# Patient Record
Sex: FEMALE | Race: WHITE | NOT HISPANIC OR LATINO
[De-identification: names, ages, dates, MRNs, and addresses within clinical notes are randomized per-mention and may not be internally consistent; named-entity substitution may affect disease eponyms.]

---

## 2018-01-31 ENCOUNTER — RESULT REVIEW (OUTPATIENT)
Age: 48
End: 2018-01-31

## 2018-02-12 ENCOUNTER — OUTPATIENT (OUTPATIENT)
Dept: OUTPATIENT SERVICES | Facility: HOSPITAL | Age: 48
LOS: 1 days | End: 2018-02-12
Payer: COMMERCIAL

## 2018-02-12 ENCOUNTER — APPOINTMENT (OUTPATIENT)
Dept: ULTRASOUND IMAGING | Facility: CLINIC | Age: 48
End: 2018-02-12
Payer: COMMERCIAL

## 2018-02-12 ENCOUNTER — APPOINTMENT (OUTPATIENT)
Dept: MAMMOGRAPHY | Facility: CLINIC | Age: 48
End: 2018-02-12
Payer: COMMERCIAL

## 2018-02-12 DIAGNOSIS — Z00.8 ENCOUNTER FOR OTHER GENERAL EXAMINATION: ICD-10-CM

## 2018-02-12 PROCEDURE — 77063 BREAST TOMOSYNTHESIS BI: CPT | Mod: 26

## 2018-02-12 PROCEDURE — 77067 SCR MAMMO BI INCL CAD: CPT | Mod: 26

## 2018-02-12 PROCEDURE — 76856 US EXAM PELVIC COMPLETE: CPT | Mod: 26

## 2018-02-12 PROCEDURE — 76856 US EXAM PELVIC COMPLETE: CPT

## 2018-02-12 PROCEDURE — 76830 TRANSVAGINAL US NON-OB: CPT | Mod: 26

## 2018-02-12 PROCEDURE — 76830 TRANSVAGINAL US NON-OB: CPT

## 2018-02-12 PROCEDURE — 77063 BREAST TOMOSYNTHESIS BI: CPT

## 2018-02-12 PROCEDURE — 77067 SCR MAMMO BI INCL CAD: CPT

## 2020-04-30 ENCOUNTER — MESSAGE (OUTPATIENT)
Age: 50
End: 2020-04-30

## 2020-05-06 LAB
SARS-COV-2 IGG SERPL IA-ACNC: 0.1 INDEX
SARS-COV-2 IGG SERPL QL IA: NEGATIVE

## 2020-05-21 ENCOUNTER — APPOINTMENT (OUTPATIENT)
Dept: OBGYN | Facility: CLINIC | Age: 50
End: 2020-05-21
Payer: COMMERCIAL

## 2020-05-21 VITALS
SYSTOLIC BLOOD PRESSURE: 138 MMHG | HEIGHT: 65 IN | DIASTOLIC BLOOD PRESSURE: 82 MMHG | BODY MASS INDEX: 48.82 KG/M2 | WEIGHT: 293 LBS

## 2020-05-21 PROCEDURE — 82270 OCCULT BLOOD FECES: CPT

## 2020-05-21 PROCEDURE — 99396 PREV VISIT EST AGE 40-64: CPT | Mod: 25

## 2020-05-21 PROCEDURE — 58301 REMOVE INTRAUTERINE DEVICE: CPT

## 2020-05-21 PROCEDURE — 81003 URINALYSIS AUTO W/O SCOPE: CPT | Mod: QW

## 2020-05-23 LAB
C TRACH RRNA SPEC QL NAA+PROBE: NOT DETECTED
HPV HIGH+LOW RISK DNA PNL CVX: NOT DETECTED
N GONORRHOEA RRNA SPEC QL NAA+PROBE: NOT DETECTED
SOURCE TP AMPLIFICATION: NORMAL

## 2020-05-28 ENCOUNTER — RESULT REVIEW (OUTPATIENT)
Age: 50
End: 2020-05-28

## 2020-05-28 ENCOUNTER — APPOINTMENT (OUTPATIENT)
Dept: MAMMOGRAPHY | Facility: CLINIC | Age: 50
End: 2020-05-28
Payer: COMMERCIAL

## 2020-05-28 PROCEDURE — 77063 BREAST TOMOSYNTHESIS BI: CPT

## 2020-05-28 PROCEDURE — 77067 SCR MAMMO BI INCL CAD: CPT

## 2020-05-29 LAB
ACTINOMYCES CULTURE FOR IUD: NORMAL
CYTOLOGY CVX/VAG DOC THIN PREP: NORMAL

## 2020-05-31 NOTE — PHYSICAL EXAM
[Awake] : awake [Acute Distress] : no acute distress [Alert] : alert [Nipple Discharge] : no nipple discharge [Mass] : no breast mass [Tender] : non tender [Axillary LAD] : no axillary lymphadenopathy [Soft] : soft [Oriented x3] : oriented to person, place, and time [Labia Majora] : labia major [Normal] : clitoris [Labia Minora] : labia minora [No Bleeding] : there was no active vaginal bleeding [No Tenderness] : no rectal tenderness [IUD String] : had an IUD string protruding out [Uterine Adnexae] : were not tender and not enlarged [RRR, No Murmurs] : RRR, no murmurs [CTAB] : CTAB [Occult Blood] : occult blood test from digital rectal exam was negative

## 2020-05-31 NOTE — PROCEDURE
[Cervical Pap Smear] : cervical Pap smear [Liquid Base] : liquid base [GC & Chlamydia via Pap] : GC & Chlamydia via Pap [IUD Removal] : IUD [Mirena] : Mirena [ IUD] :  IUD [Patient] : patient [Risks] : risks [Alternatives] : alternatives [Benefits] : benefits [Speculum Placed] : a speculum was placed in the vagina [Consent Obtained] : consent was obtained prior to the procedure and is detailed in the patient's record [Strings Visualized] : the IUD strings were visualized [IUD Removed - Forceps] : the strings were grasped with forceps and the IUD was removed [Cultured] : cultured [No Complications] : none

## 2020-05-31 NOTE — HISTORY OF PRESENT ILLNESS
[Last Mammogram ___] : Last Mammogram was [unfilled] [Last Pap ___] : Last cervical pap smear was [unfilled] [BRCA1 ___] : BRCA1 gene [unfilled] [BRCA2 ___] : BRCA2 gene [unfilled] [Menarche Age: ____] : age at menarche was [unfilled] [ ___] :  [unfilled] [Hot Flashes] : hot flashes [Night Sweats] : night sweats [unknown] : the patient is unsure of the date of her LMP [Sexually Active] : is sexually active [Monogamous] : is monogamous [Contraception] : uses contraception [IUD] : has an intrauterine device [Male ___] : [unfilled] male [No] : no [de-identified] : Last Pelvic Ultrasound: 10/13/2015 [Vomiting] : no vomiting [Fever] : no fever [Nausea] : no nausea [Vaginal Bleeding] : no vaginal bleeding [Diarrhea] : no diarrhea [Pelvic Pressure] : no pelvic pressure [Burning] : no burning [Dysuria] : no dysuria [Itching] : no itching [Lesion] : no lesion [Stinging] : no stinging [Mass] : no mass [Discharge] : no discharge [Irregular Menses] : normal menses [Soreness] : no soreness [Localized Pain] : no localized pain [Mass (___cm)] : no palpable mass [Diffused Pain] : no diffused pain [Nipple Discharge] : no nipple discharge [Skin Color Change] : no skin color change [Dyspareunia] : no dyspareunia [Mood Changes] : no mood changes [Vaginal Itching] : no vaginal itching

## 2020-06-11 ENCOUNTER — ASOB RESULT (OUTPATIENT)
Age: 50
End: 2020-06-11

## 2020-06-11 ENCOUNTER — APPOINTMENT (OUTPATIENT)
Dept: OBGYN | Facility: CLINIC | Age: 50
End: 2020-06-11
Payer: COMMERCIAL

## 2020-06-11 VITALS
WEIGHT: 293 LBS | HEIGHT: 65 IN | SYSTOLIC BLOOD PRESSURE: 130 MMHG | BODY MASS INDEX: 48.82 KG/M2 | DIASTOLIC BLOOD PRESSURE: 84 MMHG

## 2020-06-11 DIAGNOSIS — B35.6 TINEA CRURIS: ICD-10-CM

## 2020-06-11 PROCEDURE — 76830 TRANSVAGINAL US NON-OB: CPT

## 2020-06-11 PROCEDURE — 81025 URINE PREGNANCY TEST: CPT

## 2020-06-11 PROCEDURE — 58300 INSERT INTRAUTERINE DEVICE: CPT

## 2020-06-11 NOTE — HISTORY OF PRESENT ILLNESS
[___ Month(s) Ago] : [unfilled] month(s) ago [Good] : being in good health [Healthy Diet] : a healthy diet [Definite:  ___ (Date)] : the last menstrual period was [unfilled] [Menarche Age: ____] : age at menarche was [unfilled] [Contraception] : uses contraception [Sexually Active] : is sexually active [Monogamous] : is monogamous [Male ___] : [unfilled] male [NA] : N/A [Last Mammogram ___] : Last Mammogram was [unfilled] [Last Pap ___] : Last cervical pap smear was [unfilled] [Up to Date] : up to date with ~his/her~ STD screening [Last Screen ___] : last STD screen was [unfilled]

## 2020-06-14 LAB
HCG UR QL: NEGATIVE
QUALITY CONTROL: YES

## 2020-06-14 NOTE — PROCEDURE
[Risks] : risks [Benefits] : benefits [Alternatives] : alternatives [Patient] : patient [Infection] : infection [Bleeding] : bleeding [Pain] : pain [Expulsion] : expulsion [Failure] : failure [Uterine Perforation] : uterine perforation [CONSENT OBTAINED] : written consent was obtained prior to the procedure. [Ibuprofen ___ mg] : ibuprofen [unfilled] ~Umg [Betadine] : Prepped with Betadine [None] : none [Tenaculum] : a single toothed tenaculum [Easy Passage] : allowed easy passage of a uterine sound without dilation [Post Placement Transvag. US] : post placement transvaginal ultrasound completed [Mirena IUD] : The Mirena IUD was inserted past the internal cervical os. The IUD was then gently inserted upwards toward the fundus.  The IUD strings were cut to an appropriate length. [Tolerated Well] : the patient tolerated the procedure well [No Complications] : there were no complications [de-identified] : Endometrial suppression

## 2020-06-14 NOTE — PHYSICAL EXAM
[Awake] : awake [Alert] : alert [Soft] : soft [Oriented x3] : oriented to person, place, and time [Labia Majora] : labia major [Labia Minora] : labia minora [Normal] : clitoris [Anteversion] : anteverted [Uterine Adnexae] : were not tender and not enlarged [RRR, No Murmurs] : RRR, no murmurs [CTAB] : CTAB [Acute Distress] : no acute distress [Tender] : non tender [Distended] : not distended [Depressed Mood] : not depressed

## 2020-10-22 ENCOUNTER — ASOB RESULT (OUTPATIENT)
Age: 50
End: 2020-10-22

## 2020-10-22 ENCOUNTER — APPOINTMENT (OUTPATIENT)
Dept: OBGYN | Facility: CLINIC | Age: 50
End: 2020-10-22
Payer: COMMERCIAL

## 2020-10-22 VITALS
WEIGHT: 293 LBS | SYSTOLIC BLOOD PRESSURE: 130 MMHG | DIASTOLIC BLOOD PRESSURE: 90 MMHG | HEIGHT: 65 IN | TEMPERATURE: 97.5 F | BODY MASS INDEX: 48.82 KG/M2

## 2020-10-22 PROCEDURE — 99072 ADDL SUPL MATRL&STAF TM PHE: CPT

## 2020-10-22 PROCEDURE — 99213 OFFICE O/P EST LOW 20 MIN: CPT | Mod: 25

## 2020-10-22 NOTE — DISCUSSION/SUMMARY
[FreeTextEntry1] : Normal pelvic sonogram from today, 10/22/20 reviewed in detail with the patient - IUD in place.\par \par Advised to monitor blood pressure at home - recommend [employer] blood pressure machine.\par \par During this visit 15 minutes were spent face-to-face with greater than 50% of the time dedicated to counseling.

## 2020-10-22 NOTE — END OF VISIT
[FreeTextEntry3] : I, Yoli Zaldivar, solely acted as scribe for Dr. Delio Kelley on 10/22/2020.\par All medical entries made by the Scribe were at my, Dr. Kelley's direction and personally dictated by me on 10/22/2020. I have reviewed the chart and agree that the record accurately reflects my personal performance of the history, physical exam, assessment and plan. I have also personally directed, reviewed, and agreed with the chart.

## 2020-10-22 NOTE — HISTORY OF PRESENT ILLNESS
[Patient reported PAP Smear was normal] : Patient reported PAP Smear was normal [Gonorrhea test offered] : Gonorrhea test offered [Chlamydia test offered] : Chlamydia test offered [HPV test offered] : HPV test offered [N] : Patient reports normal menses [Menarche Age: ____] : age at menarche was [unfilled] [Currently Active] : currently active [Men] : men [Vaginal] : vaginal [No] : No [Yes] : Yes [IUD] : has an intrauterine device [Monogamous (Male Partner)] : is monogamous with a male partner [Y] : Positive pregnancy history [Patient refuses STI testing] : Patient refuses STI testing [PapSmeardate] : 05/21/2020 [TextBox_31] : NEG [GonorrheaDate] : 05/21/2020 [TextBox_63] : NEG [ChlamydiaDate] : 05/21/2020 [TextBox_68] : NEG [HPVDate] : 05/21/2020 [TextBox_78] : NEG [LMPDate] : 09/2020 [de-identified] : Mirena [PGHxTotal] : 4 [[facility] [[facility]] : 3 [TextBox_6] : 09/2020 [TextBox_9] : 12 [FreeTextEntry1] : 09/2020 [FreeTextEntry3] : IUD ( Mirena)

## 2022-03-22 ENCOUNTER — APPOINTMENT (OUTPATIENT)
Dept: OBGYN | Facility: CLINIC | Age: 52
End: 2022-03-22
Payer: COMMERCIAL

## 2022-03-22 VITALS
WEIGHT: 293 LBS | SYSTOLIC BLOOD PRESSURE: 132 MMHG | DIASTOLIC BLOOD PRESSURE: 80 MMHG | HEIGHT: 65 IN | BODY MASS INDEX: 48.82 KG/M2

## 2022-03-22 DIAGNOSIS — N60.19 DIFFUSE CYSTIC MASTOPATHY OF UNSPECIFIED BREAST: ICD-10-CM

## 2022-03-22 LAB
DATE COLLECTED: NORMAL
HEMOCCULT SP1 STL QL: NEGATIVE
QUALITY CONTROL: YES

## 2022-03-22 PROCEDURE — 82270 OCCULT BLOOD FECES: CPT

## 2022-03-22 PROCEDURE — 99396 PREV VISIT EST AGE 40-64: CPT

## 2022-03-25 RX ORDER — CLOTRIMAZOLE AND BETAMETHASONE DIPROPIONATE 10; .5 MG/G; MG/G
1-0.05 CREAM TOPICAL TWICE DAILY
Qty: 1 | Refills: 0 | Status: ACTIVE | COMMUNITY
Start: 2020-06-11 | End: 1900-01-01

## 2022-03-28 ENCOUNTER — TRANSCRIPTION ENCOUNTER (OUTPATIENT)
Age: 52
End: 2022-03-28

## 2022-04-26 ENCOUNTER — APPOINTMENT (OUTPATIENT)
Dept: ULTRASOUND IMAGING | Facility: CLINIC | Age: 52
End: 2022-04-26

## 2022-04-26 ENCOUNTER — RESULT REVIEW (OUTPATIENT)
Age: 52
End: 2022-04-26

## 2022-04-26 ENCOUNTER — APPOINTMENT (OUTPATIENT)
Dept: MAMMOGRAPHY | Facility: CLINIC | Age: 52
End: 2022-04-26
Payer: COMMERCIAL

## 2022-04-26 PROCEDURE — 77067 SCR MAMMO BI INCL CAD: CPT

## 2022-04-26 PROCEDURE — 77063 BREAST TOMOSYNTHESIS BI: CPT

## 2022-04-26 PROCEDURE — 76641 ULTRASOUND BREAST COMPLETE: CPT | Mod: 50

## 2022-04-26 PROCEDURE — 76830 TRANSVAGINAL US NON-OB: CPT

## 2022-05-09 LAB
CYTOLOGY CVX/VAG DOC THIN PREP: NORMAL
HPV HIGH+LOW RISK DNA PNL CVX: NOT DETECTED

## 2022-05-09 NOTE — HISTORY OF PRESENT ILLNESS
[LMP unknown] : LMP unknown [IUD] : has an intrauterine device [Y] : Positive pregnancy history [unknown] : Patient is unsure of the date of her LMP [Menarche Age: ____] : age at menarche was [unfilled] [No] : Patient does not have concerns regarding sex [Currently Active] : currently active [Men] : men [FreeTextEntry1] : Deborah is here for annual exam and she reports that she is doing well with the exception of some weight gain following isolation due to Covid. [Mammogramdate] : 05/28/20 [TextBox_19] : BR1 [Papeardate] : 05/21/20 [TextBox_31] : NEG [HPVDate] : 05/21/20 [TextBox_78] : NEG [de-identified] : MIRENA [PGHxTotal] : 4 [[facility]] : 4 [[facility]] : 3

## 2022-05-09 NOTE — DISCUSSION/SUMMARY
[FreeTextEntry1] : During this visit comprehensive counseling was given regarding the following concerns:\par \par 1 We discussed the need for proper nutrition and exercise.  This includes cardiovascular and pelvic floor exercise.\par \par 2 We discussed the importance of maintaining a proper vaccination schedule and we discussed the utility of the flu vaccine and TDaP and Covid 19 when available.\par \par 3 We discussed the impact of chronic sun exposure and the risk for skin disease as a result. The benefits of a total body scan by a Dermatologist was reviewed..\par \par 4 We discussed the need for certain supplements for most people which include vitamin D3, calcium rich foods with limitation on calcium supplementation by tabular form to 600        mg by mouth daily.  As part of a bone health program we recommend weightbearing exercises, and some limited sun exposure ( 10-15 minutes daily) to help convert vitamin D to its active form.\par She will go for her breast imaging as prescribed.  We will also perform a transvaginal sonogram due to her obesity to evaluate the endometrium the position of her IUD and her ovarian status.

## 2022-05-09 NOTE — PHYSICAL EXAM
[Chaperone Present] : A chaperone was present in the examining room during all aspects of the physical examination [FreeTextEntry1] : Kim Machado [Appropriately responsive] : appropriately responsive [Alert] : alert [No Acute Distress] : no acute distress [No Lymphadenopathy] : no lymphadenopathy [Regular Rate Rhythm] : regular rate rhythm [No Murmurs] : no murmurs [Clear to Auscultation B/L] : clear to auscultation bilaterally [Soft] : soft [Non-tender] : non-tender [Non-distended] : non-distended [No HSM] : No HSM [No Lesions] : no lesions [No Mass] : no mass [Oriented x3] : oriented x3 [Examination Of The Breasts] : a normal appearance [No Masses] : no breast masses were palpable [Labia Majora] : normal [Labia Minora] : normal [IUD String] : an IUD string was noted [Normal] : normal [Uterine Adnexae] : normal [No Tenderness] : no tenderness [Nl Sphincter Tone] : normal sphincter tone [FreeTextEntry9] : Negative heme

## 2023-12-04 ENCOUNTER — APPOINTMENT (OUTPATIENT)
Dept: OBGYN | Facility: CLINIC | Age: 53
End: 2023-12-04

## 2023-12-13 ENCOUNTER — NON-APPOINTMENT (OUTPATIENT)
Age: 53
End: 2023-12-13

## 2023-12-14 ENCOUNTER — APPOINTMENT (OUTPATIENT)
Dept: OBGYN | Facility: CLINIC | Age: 53
End: 2023-12-14
Payer: COMMERCIAL

## 2023-12-14 VITALS
SYSTOLIC BLOOD PRESSURE: 133 MMHG | BODY MASS INDEX: 48.82 KG/M2 | HEIGHT: 65 IN | DIASTOLIC BLOOD PRESSURE: 90 MMHG | WEIGHT: 293 LBS

## 2023-12-14 DIAGNOSIS — Z01.419 ENCOUNTER FOR GYNECOLOGICAL EXAMINATION (GENERAL) (ROUTINE) W/OUT ABNORMAL FINDINGS: ICD-10-CM

## 2023-12-14 DIAGNOSIS — Z97.5 PRESENCE OF (INTRAUTERINE) CONTRACEPTIVE DEVICE: ICD-10-CM

## 2023-12-14 DIAGNOSIS — Z12.12 ENCOUNTER FOR SCREENING FOR MALIGNANT NEOPLASM OF RECTUM: ICD-10-CM

## 2023-12-14 DIAGNOSIS — E66.01 MORBID (SEVERE) OBESITY DUE TO EXCESS CALORIES: ICD-10-CM

## 2023-12-14 DIAGNOSIS — Z12.4 ENCOUNTER FOR SCREENING FOR MALIGNANT NEOPLASM OF CERVIX: ICD-10-CM

## 2023-12-14 LAB
DATE COLLECTED: NORMAL
HEMOCCULT SP1 STL QL: NEGATIVE
QUALITY CONTROL: YES

## 2023-12-14 PROCEDURE — 82270 OCCULT BLOOD FECES: CPT

## 2023-12-14 PROCEDURE — 99396 PREV VISIT EST AGE 40-64: CPT

## 2023-12-14 RX ORDER — NYSTATIN 100000 1/G
100000 POWDER TOPICAL
Qty: 1 | Refills: 2 | Status: ACTIVE | COMMUNITY
Start: 2022-03-25 | End: 1900-01-01

## 2023-12-14 NOTE — HISTORY OF PRESENT ILLNESS
[LMP unknown] : LMP unknown [IUD] : has an intrauterine device [Y] : Positive pregnancy history [unknown] : Patient is unsure of the date of her LMP [Menarche Age: ____] : age at menarche was [unfilled] [Currently Active] : currently active [Men] : men [FreeTextEntry1] : Deborah presents for an annual gynecology exam today. She is doing well.  [Mammogramdate] : 4/26/22 [TextBox_19] : BR1 [BreastSonogramDate] : 4/26/22 [TextBox_25] : BR1 [PapSmeardate] : 3/22/22 [TextBox_31] : NEG [GonorrheaDate] : 5/21/20 [TextBox_63] : NEG [ChlamydiaDate] : 5/21/20 [TextBox_68] : NEG [HPVDate] : 3/22/22 [TextBox_78] : NEG [de-identified] : s/p Mirena IUD insertion in 2020 [PGHxTotal] : 4 [[facility]] : 4 [[facility]] : 3

## 2023-12-14 NOTE — DISCUSSION/SUMMARY
[FreeTextEntry1] : -Annual gynecology exam- 1) Pap smear collected. 2) Benign rectal exam. Hemoccult negative. Patient had Cologuard kit done and denies a family h/o colon cancer. The importance of a baseline colonoscopy screening was discussed. She was strongly encouraged to follow up with GI or colorectal surgeon for baseline colonoscopy screening. 3) Prescription for mammogram screening, breast ultrasound, and DEXA studies ordered. 4) Erythematous rash seen in groin/thigh area. Recommended applying Cerave ointment. Also recommended cleansing area with Dove sensitive soap and a disposable face cloth. -Nystatin powder under abdominal area in IT zones.    -We discussed the need for proper nutrition and exercise. She was encouraged to follow up with her PCP for medical weight loss management with Wegovy or Mounjaro. The risks, benefits, and alternatives of Wegovy and Mounjaro were reviewed. All questions and support was given.   -During this visit comprehensive counseling was given regarding the following concerns: 1. We discussed the need for proper nutrition and exercise. This includes cardiovascular and pelvic floor exercises. 2. We discussed the importance of maintaining proper vaccination schedule and we discussed the utility of the HPV, Influenza, Shingles, and TDaP vaccines. 3. We discussed the impact of chronic sun exposure and the risk for skin disease as a result. The benefits of a total body scan by a Dermatologist was reviewed. 4. We discussed the need for certain supplements for most people, which include Vitamin D3 (2000 IU) and calcium rich foods with limitation on calcium supplementation by tabular form to 600 MG by mouth daily. As part of bone health program, we recommend weight bearing exercises and limited sun exposure (10-15 minutes daily) to help convert Vitamin D to its active form.   -She will follow up in 1 year or as needed.   -All questions and concerns were addressed.

## 2023-12-14 NOTE — END OF VISIT
[FreeTextEntry3] : I, Jenny Tolentino solely acted as a scribe for Dr. Delio Kelley on 12/14/2023 . All medical entries made by the scribe were at my, Dr. Kelley's, direction and personally dictated by me on 12/14/2023 . I have reviewed the chart and agree that the record accurately reflects my personal performance of the history, physical exam, assessment and plan. I have also personally directed, reviewed, and agreed with the chart.

## 2023-12-14 NOTE — PHYSICAL EXAM
Additional Information?: Pt has allergy to Aspirin. No questions or concerns. [Chaperone Present] : A chaperone was present in the examining room during all aspects of the physical examination [Appropriately responsive] : appropriately responsive [Alert] : alert [No Acute Distress] : no acute distress [No Lymphadenopathy] : no lymphadenopathy [Soft] : soft [Non-tender] : non-tender [Non-distended] : non-distended [No Lesions] : no lesions [No Mass] : no mass [Oriented x3] : oriented x3 [Examination Of The Breasts] : a normal appearance [No Discharge] : no discharge [No Masses] : no breast masses were palpable [Labia Majora] : normal [Labia Minora] : normal [Atrophy] : atrophy [Dry Mucosa] : dry mucosa [No Bleeding] : There was no active vaginal bleeding [Normal] : normal [Uterine Adnexae] : normal [Normal rectal exam] : was normal [IUD String] : an IUD string was noted [FreeTextEntry1] : Jenny Tolentino MA [FreeTextEntry7] : large pannus, some intertrigo noted [Occult Blood Positive] : was negative for occult blood analysis

## 2023-12-15 LAB — HPV HIGH+LOW RISK DNA PNL CVX: NOT DETECTED

## 2023-12-18 LAB — CYTOLOGY CVX/VAG DOC THIN PREP: ABNORMAL

## 2024-01-05 ENCOUNTER — APPOINTMENT (OUTPATIENT)
Dept: ULTRASOUND IMAGING | Facility: CLINIC | Age: 54
End: 2024-01-05
Payer: COMMERCIAL

## 2024-01-05 ENCOUNTER — RESULT REVIEW (OUTPATIENT)
Age: 54
End: 2024-01-05

## 2024-01-05 ENCOUNTER — APPOINTMENT (OUTPATIENT)
Dept: MAMMOGRAPHY | Facility: CLINIC | Age: 54
End: 2024-01-05
Payer: COMMERCIAL

## 2024-01-05 PROCEDURE — 76641 ULTRASOUND BREAST COMPLETE: CPT | Mod: 50

## 2024-01-05 PROCEDURE — 77063 BREAST TOMOSYNTHESIS BI: CPT

## 2024-01-05 PROCEDURE — 77067 SCR MAMMO BI INCL CAD: CPT

## 2024-01-05 PROCEDURE — 76830 TRANSVAGINAL US NON-OB: CPT

## 2024-02-10 ENCOUNTER — OFFICE (OUTPATIENT)
Facility: LOCATION | Age: 54
Setting detail: OPHTHALMOLOGY
End: 2024-02-10
Payer: COMMERCIAL

## 2024-02-10 DIAGNOSIS — H25.13: ICD-10-CM

## 2024-02-10 DIAGNOSIS — H43.812: ICD-10-CM

## 2024-02-10 DIAGNOSIS — H16.223: ICD-10-CM

## 2024-02-10 DIAGNOSIS — H35.413: ICD-10-CM

## 2024-02-10 DIAGNOSIS — H52.13: ICD-10-CM

## 2024-02-10 DIAGNOSIS — H31.29: ICD-10-CM

## 2024-02-10 PROCEDURE — 92250 FUNDUS PHOTOGRAPHY W/I&R: CPT | Performed by: OPHTHALMOLOGY

## 2024-02-10 PROCEDURE — 92014 COMPRE OPH EXAM EST PT 1/>: CPT | Performed by: OPHTHALMOLOGY

## 2024-02-10 PROCEDURE — 92015 DETERMINE REFRACTIVE STATE: CPT | Performed by: OPHTHALMOLOGY

## 2024-02-10 ASSESSMENT — DECREASING TEAR LAKE - SEVERITY SCORE
OD_DEC_TEARLAKE: 1+
OS_DEC_TEARLAKE: 1+

## 2024-02-10 ASSESSMENT — CONFRONTATIONAL VISUAL FIELD TEST (CVF)
OD_FINDINGS: FULL
OS_FINDINGS: FULL

## 2024-02-16 ASSESSMENT — REFRACTION_MANIFEST
OU_VA: 20/20
OD_VA1: 20/25-2
OS_ADD: +2.25
OS_VA2: 20/20(J1+)
OD_ADD: +2.25
OD_SPHERE: -6.00
OD_AXIS: 010
OD_AXIS: 100
OD_VA1: 20/25-2
OS_ADD: +2.25
OS_AXIS: 170
OD_SPHERE: -8.00
OS_AXIS: 080
OD_CYLINDER: -1.75
OS_SPHERE: -7.75
OS_CYLINDER: +1.75
OS_SPHERE: -7.00
OS_VA1: 20/20
OD_CYLINDER: +2.00
OS_VA2: 20/20(J1+)
OS_VA1: 20/20
OD_VA2: 20/20(J1+)
OS_CYLINDER: -1.25
OD_ADD: +2.25
OU_VA: 20/20
OD_VA2: 20/20(J1+)

## 2024-02-16 ASSESSMENT — REFRACTION_AUTOREFRACTION
OS_CYLINDER: +2.50
OS_SPHERE: -8.25
OD_CYLINDER: +3.50
OD_SPHERE: -9.00
OD_AXIS: 109
OS_AXIS: 077

## 2024-02-16 ASSESSMENT — SPHEQUIV_DERIVED
OS_SPHEQUIV: -7.625
OD_SPHEQUIV: -7.25
OS_SPHEQUIV: -6.875
OS_SPHEQUIV: -7
OD_SPHEQUIV: -7
OD_SPHEQUIV: -6.875

## 2024-02-16 ASSESSMENT — REFRACTION_CURRENTRX
OD_VPRISM_DIRECTION: PROGS
OD_ADD: +2.00
OS_VPRISM_DIRECTION: PROGS
OS_SPHERE: -7.50
OS_AXIS: 077
OD_SPHERE: -7.00
OS_OVR_VA: 20/
OD_CYLINDER: +1.75
OD_AXIS: 101
OS_ADD: +2.00
OD_OVR_VA: 20/
OS_CYLINDER: +1.25

## 2024-02-28 ENCOUNTER — OFFICE (OUTPATIENT)
Facility: LOCATION | Age: 54
Setting detail: OPHTHALMOLOGY
End: 2024-02-28
Payer: COMMERCIAL

## 2024-02-28 DIAGNOSIS — H52.4: ICD-10-CM

## 2024-02-28 DIAGNOSIS — H35.413: ICD-10-CM

## 2024-02-28 DIAGNOSIS — H52.13: ICD-10-CM

## 2024-02-28 DIAGNOSIS — H52.213: ICD-10-CM

## 2024-02-28 DIAGNOSIS — H16.223: ICD-10-CM

## 2024-02-28 DIAGNOSIS — H43.812: ICD-10-CM

## 2024-02-28 DIAGNOSIS — H25.13: ICD-10-CM

## 2024-02-28 DIAGNOSIS — H31.29: ICD-10-CM

## 2024-02-28 PROCEDURE — 92014 COMPRE OPH EXAM EST PT 1/>: CPT | Performed by: OPTOMETRIST

## 2024-02-28 PROCEDURE — 92060 SENSORIMOTOR EXAMINATION: CPT | Performed by: OPTOMETRIST

## 2024-02-28 PROCEDURE — 92015 DETERMINE REFRACTIVE STATE: CPT | Performed by: OPTOMETRIST

## 2024-02-28 PROCEDURE — CONTACT LE COOPER-: Performed by: OPTOMETRIST

## 2024-02-28 PROCEDURE — 92025 CPTRIZED CORNEAL TOPOGRAPHY: CPT | Performed by: OPTOMETRIST

## 2024-02-28 ASSESSMENT — DECREASING TEAR LAKE - SEVERITY SCORE
OS_DEC_TEARLAKE: 1+
OD_DEC_TEARLAKE: 1+

## 2024-02-28 ASSESSMENT — CONFRONTATIONAL VISUAL FIELD TEST (CVF)
OD_FINDINGS: FULL
OS_FINDINGS: FULL

## 2024-03-01 ASSESSMENT — REFRACTION_MANIFEST
OS_SPHERE: -7.75
OS_ADD: +2.25
OD_SPHERE: -9.00
OD_CYLINDER: +2.00
OD_VA2: 20/20(J1+)
OD_CYLINDER: +2.75
OD_SPHERE: -8.00
OS_CYLINDER: +1.75
OS_VA1: 20/20
OD_AXIS: 105
OU_VA: 20/20
OS_VA2: 20/20(J1+)
OD_ADD: +2.25
OD_VA1: 20/25-2
OS_ADD: +2.75
OS_CYLINDER: +2.25
OS_AXIS: 080
OU_VA: 20/20
OS_VA1: 20/20
OD_AXIS: 100
OS_SPHERE: -8.00
OD_VA2: 20/20(J1+)
OS_VA2: 20/20(J1+)
OS_AXIS: 80
OD_ADD: +2.75
OD_VA1: 20/25-2

## 2024-03-01 ASSESSMENT — REFRACTION_CURRENTRX
OS_AXIS: 066
OD_AXIS: 107
OD_OVR_VA: 20/
OS_OVR_VA: 20/
OD_VPRISM_DIRECTION: PROGS
OS_SPHERE: -7.75
OD_ADD: +2.00
OS_ADD: +2.00
OS_VPRISM_DIRECTION: PROGS
OD_SPHERE: -7.00
OS_CYLINDER: +1.50
OD_CYLINDER: +1.75

## 2024-03-01 ASSESSMENT — KERATOMETRY
METHOD_AUTO_MANUAL: AUTO
OD_K2POWER_DIOPTERS: 44.25
OS_AXISANGLE_DEGREES: 084
OD_K1POWER_DIOPTERS: 41.50
OD_AXISANGLE_DEGREES: 103
OS_K1POWER_DIOPTERS: 41.25
OS_K2POWER_DIOPTERS: 44.00

## 2024-03-01 ASSESSMENT — AXIALLENGTH_DERIVED
OS_AL: 27.0289
OD_AL: 27.2945
OD_AL: 26.9724
OS_AL: 27.0289
OD_AL: 27.2945
OS_AL: 26.9651

## 2024-03-01 ASSESSMENT — REFRACTION_AUTOREFRACTION
OD_AXIS: 106
OS_AXIS: 078
OD_SPHERE: -9.50
OS_CYLINDER: +2.50
OS_SPHERE: -8.00
OD_CYLINDER: +3.75

## 2024-03-01 ASSESSMENT — SPHEQUIV_DERIVED
OS_SPHEQUIV: -6.75
OS_SPHEQUIV: -6.875
OD_SPHEQUIV: -7
OD_SPHEQUIV: -7.625
OS_SPHEQUIV: -6.875
OD_SPHEQUIV: -7.625

## 2024-03-13 ENCOUNTER — APPOINTMENT (OUTPATIENT)
Dept: ORTHOPEDIC SURGERY | Facility: CLINIC | Age: 54
End: 2024-03-13
Payer: COMMERCIAL

## 2024-03-13 DIAGNOSIS — Z92.89 PERSONAL HISTORY OF OTHER MEDICAL TREATMENT: ICD-10-CM

## 2024-03-13 DIAGNOSIS — Z86.39 PERSONAL HISTORY OF OTHER ENDOCRINE, NUTRITIONAL AND METABOLIC DISEASE: ICD-10-CM

## 2024-03-13 DIAGNOSIS — Z78.9 OTHER SPECIFIED HEALTH STATUS: ICD-10-CM

## 2024-03-13 DIAGNOSIS — Z87.891 PERSONAL HISTORY OF NICOTINE DEPENDENCE: ICD-10-CM

## 2024-03-13 PROCEDURE — 99203 OFFICE O/P NEW LOW 30 MIN: CPT

## 2024-03-13 RX ORDER — LEVOTHYROXINE SODIUM 200 UG/1
200 TABLET ORAL
Refills: 0 | Status: ACTIVE | COMMUNITY

## 2024-03-13 RX ORDER — MONTELUKAST SODIUM 10 MG/1
10 TABLET, FILM COATED ORAL
Refills: 0 | Status: ACTIVE | COMMUNITY

## 2024-03-13 RX ORDER — ESCITALOPRAM OXALATE 20 MG/1
20 TABLET, FILM COATED ORAL
Refills: 0 | Status: ACTIVE | COMMUNITY

## 2024-03-13 NOTE — HISTORY OF PRESENT ILLNESS
[de-identified] : Age: 53 year F PMHx: Thyroid Disease Hand Dominance: LHD Chief Complaint: left hand Trauma: Patient reports feeling numbness/tingling for 1-2 months with NKI. Patient reports feeling intermittent soreness and sensitivity in the hand. Patient reports intermittent numbness in the wrist.  Outside Imaging/Treatment: none OTC Medications: Motrin for back OT/PT: none Bracing: none Pain worse with: sleeping, crossing arms, blow drying hair Pain better with: nothing

## 2024-03-13 NOTE — IMAGING
[de-identified] : Left wrist with no swelling nor erythema. Able to make fist, oppose thumb to small finger and abduct fingers, no overt atrophy. +Phalen's, +tinel at carpal, -tinel at [place], -tinel at cubital. -froment, -wartenberg. Intact sensation in median and intact at superficial radial and intact in small and ulnar ring finger(normal at ulnar hand) prior to provocative testing. <2sec cap refill.

## 2024-03-13 NOTE — ASSESSMENT
[FreeTextEntry1] : L CTS - reviewed pathoanatomy. Encouraged nighttime cockup wrist bracing. Will obtain LUE EMG/NCV in light of severity of symptoms - patient will follow-up thereafter to discuss results and develop plan-of-care.  F/u after EMG/NCV

## 2024-03-19 ENCOUNTER — APPOINTMENT (OUTPATIENT)
Dept: NEUROLOGY | Facility: CLINIC | Age: 54
End: 2024-03-19
Payer: COMMERCIAL

## 2024-03-19 PROCEDURE — 95886 MUSC TEST DONE W/N TEST COMP: CPT

## 2024-03-19 PROCEDURE — 95911 NRV CNDJ TEST 9-10 STUDIES: CPT

## 2024-03-22 ENCOUNTER — APPOINTMENT (OUTPATIENT)
Dept: ORTHOPEDIC SURGERY | Facility: CLINIC | Age: 54
End: 2024-03-22
Payer: COMMERCIAL

## 2024-03-22 PROCEDURE — 99213 OFFICE O/P EST LOW 20 MIN: CPT

## 2024-03-22 PROCEDURE — 99214 OFFICE O/P EST MOD 30 MIN: CPT

## 2024-03-22 NOTE — ASSESSMENT
[FreeTextEntry1] : Left CTS - reviewed LUE EMG/NCV results of left moderate CTS. Given severity and patient's continued symptoms despite nonoperative management, patient indicated for carpal tunnel release. We did have discussion of continuing nonoperative management; however, patient communicated symptoms were interfering with sleep and ADLs and she wanted to proceed with operative release. We discussed the risks, benefits and alternatives to carpal tunnel release including risk of neurovascular injury, wound dehiscence/infection, continued numbness, continued weakness, need for reoperation, DVT and medical complications associated with anesthesia. Discussed that decompression halts progression, but that improvement with existing sensory or motor deficits are not guaranteed to return. Patient understood this conversation and all questions were answered. She elected to proceed.  Plan for left open carpal tunnel release under local with sedation. PJASC.  F/u for surgery, 10 days thereafter

## 2024-03-22 NOTE — HISTORY OF PRESENT ILLNESS
[de-identified] : Age: 53 year F PMHx: Thyroid Disease Hand Dominance: LHD Chief Complaint: left hand Trauma: Patient reports feeling numbness/tingling for 1-2 months with NKI. Patient reports feeling intermittent soreness and sensitivity in the hand. Patient reports intermittent numbness in the wrist.  Outside Imaging/Treatment: none OTC Medications: Motrin for back OT/PT: none Bracing: none Pain worse with: sleeping, crossing arms, blow drying hair Pain better with: nothing  3/22/24: Patient is here for EMG results for the LT hand/wrist. Patient reports feeling the same as last visit, still numbness and tingling. Patient states that she has been taking Motrin for her back.

## 2024-03-22 NOTE — IMAGING
[de-identified] : Left wrist with no swelling nor erythema. Able to make fist, oppose thumb to small finger and abduct fingers, no overt atrophy. +Phalen's, +tinel at carpal, -tinel at [place], -tinel at cubital. -froment, -wartenberg. Intact sensation in median and intact at superficial radial and intact in small and ulnar ring finger(normal at ulnar hand) prior to provocative testing. <2sec cap refill.

## 2024-04-15 ASSESSMENT — SPHEQUIV_DERIVED
OD_SPHEQUIV: -7
OS_SPHEQUIV: -6.875
OD_SPHEQUIV: -7.625
OS_SPHEQUIV: -6.875

## 2024-04-15 ASSESSMENT — REFRACTION_MANIFEST
OD_CYLINDER: +2.00
OD_SPHERE: -9.00
OD_ADD: +2.25
OS_VA2: 20/20(J1+)
OS_CYLINDER: +2.25
OS_VA1: 20/20
OS_SPHERE: -7.75
OS_ADD: +2.25
OS_SPHERE: -8.00
OD_CYLINDER: +2.75
OS_ADD: +2.75
OD_AXIS: 100
OD_VA2: 20/20(J1+)
OD_SPHERE: -8.00
OS_AXIS: 80
OS_VA2: 20/20(J1+)
OU_VA: 20/20
OS_AXIS: 080
OD_VA1: 20/25-2
OD_AXIS: 105
OD_VA1: 20/25-2
OD_VA2: 20/20(J1+)
OS_CYLINDER: +1.75
OD_ADD: +2.75
OU_VA: 20/20
OS_VA1: 20/20

## 2024-04-15 ASSESSMENT — KERATOMETRY
OD_AXISANGLE_DEGREES: 103
OD_K1POWER_DIOPTERS: 41.50
OS_K1POWER_DIOPTERS: 41.25
OD_K2POWER_DIOPTERS: 44.25
METHOD_AUTO_MANUAL: AUTO
OS_AXISANGLE_DEGREES: 084
OS_K2POWER_DIOPTERS: 44.00

## 2024-04-15 ASSESSMENT — REFRACTION_CURRENTRX
OD_SPHERE: -7.00
OD_AXIS: 107
OD_OVR_VA: 20/
OS_OVR_VA: 20/
OS_SPHERE: -7.75
OS_ADD: +2.00
OS_CYLINDER: +1.50
OS_VPRISM_DIRECTION: PROGS
OD_CYLINDER: +1.75
OD_ADD: +2.00
OS_AXIS: 066
OD_VPRISM_DIRECTION: PROGS

## 2024-04-15 ASSESSMENT — AXIALLENGTH_DERIVED
OS_AL: 27.0289
OS_AL: 27.0289
OD_AL: 26.9724
OD_AL: 27.2945

## 2024-04-17 ENCOUNTER — APPOINTMENT (OUTPATIENT)
Dept: ORTHOPEDIC SURGERY | Facility: AMBULATORY SURGERY CENTER | Age: 54
End: 2024-04-17
Payer: COMMERCIAL

## 2024-04-17 PROCEDURE — 64721 CARPAL TUNNEL SURGERY: CPT | Mod: LT

## 2024-04-29 ENCOUNTER — APPOINTMENT (OUTPATIENT)
Dept: ORTHOPEDIC SURGERY | Facility: CLINIC | Age: 54
End: 2024-04-29
Payer: COMMERCIAL

## 2024-04-29 VITALS — WEIGHT: 293 LBS | BODY MASS INDEX: 48.82 KG/M2 | HEIGHT: 65 IN

## 2024-04-29 DIAGNOSIS — M25.532 PAIN IN LEFT WRIST: ICD-10-CM

## 2024-04-29 PROCEDURE — 99024 POSTOP FOLLOW-UP VISIT: CPT

## 2024-04-30 PROBLEM — M25.532 LEFT WRIST PAIN: Status: ACTIVE | Noted: 2024-03-13

## 2024-04-30 NOTE — ASSESSMENT
[FreeTextEntry1] : S/p left open CTR [04/17/24] - progressing well postop. Sutures removed, patent tolerated well. WBAT.  F/u 6 weeks

## 2024-04-30 NOTE — HISTORY OF PRESENT ILLNESS
[de-identified] : Age: 53 year F PMHx: Thyroid Disease Hand Dominance: LHD Chief Complaint: left hand Trauma: Patient reports feeling numbness/tingling for 1-2 months with NKI. Patient reports feeling intermittent soreness and sensitivity in the hand. Patient reports intermittent numbness in the wrist.  Outside Imaging/Treatment: none OTC Medications: Motrin for back OT/PT: none Bracing: none Pain worse with: sleeping, crossing arms, blow drying hair Pain better with: nothing  3/22/24: Patient is here for EMG results for the LT hand/wrist. Patient reports feeling the same as last visit, still numbness and tingling. Patient states that she has been taking Motrin for her back.  04/29/24: S/p left open CTR [04/17/24]. Patient reports that she is doing well, stating some discomfort with certain movements. Currently taking motrin but states it is for her back. Denies numbness/tingling. Denies fevers, chills, SOB.

## 2024-04-30 NOTE — IMAGING
[de-identified] : Left wrist with no swelling nor erythema. Able to make fist, oppose thumb to small finger and abduct fingers, no overt atrophy. Incision well healed, no erythema nor drainage. Intact sensation in median and intact at superficial radial and intact in small and ulnar ring finger(normal at ulnar hand) prior to provocative testing. <2sec cap refill.

## 2024-06-07 ENCOUNTER — APPOINTMENT (OUTPATIENT)
Dept: ORTHOPEDIC SURGERY | Facility: CLINIC | Age: 54
End: 2024-06-07
Payer: COMMERCIAL

## 2024-06-07 VITALS — BODY MASS INDEX: 48.82 KG/M2 | HEIGHT: 65 IN | WEIGHT: 293 LBS

## 2024-06-07 DIAGNOSIS — G56.00 CARPAL TUNNEL SYNDROME, UNSPECIFIED UPPER LIMB: ICD-10-CM

## 2024-06-07 PROCEDURE — 99024 POSTOP FOLLOW-UP VISIT: CPT

## 2024-06-07 NOTE — HISTORY OF PRESENT ILLNESS
[de-identified] : Age: 53 year F PMHx: Thyroid Disease Hand Dominance: LHD Chief Complaint: left hand Trauma: Patient reports feeling numbness/tingling for 1-2 months with NKI. Patient reports feeling intermittent soreness and sensitivity in the hand. Patient reports intermittent numbness in the wrist.  Outside Imaging/Treatment: none OTC Medications: Motrin for back OT/PT: none Bracing: none Pain worse with: sleeping, crossing arms, blow drying hair Pain better with: nothing  3/22/24: Patient is here for EMG results for the LT hand/wrist. Patient reports feeling the same as last visit, still numbness and tingling. Patient states that she has been taking Motrin for her back.  04/29/24: S/p left open CTR [04/17/24]. Patient reports that she is doing well, stating some discomfort with certain movements. Currently taking motrin but states it is for her back. Denies numbness/tingling. Denies fevers, chills, SOB.   06/07/24: s/p left open CTR [04/17/24]. Patient reports that she is doing well, only reporting some minor discomfort with usage. Patient is currently back to work full duty. Denies numbness/tingling.

## 2024-06-07 NOTE — IMAGING
[de-identified] : Left wrist with no swelling nor erythema. Able to make fist, oppose thumb to small finger and abduct fingers, no overt atrophy. Incision well healed, no erythema nor drainage. Intact sensation in median and intact at superficial radial and intact in small and ulnar ring finger(normal at ulnar hand) prior to provocative testing. <2sec cap refill.

## 2025-07-15 PROBLEM — D31.01 NEVUS,CONJUNCTIVAL; RIGHT EYE, LEFT EYE: Status: ACTIVE | Noted: 2025-07-15

## 2025-07-15 PROBLEM — D31.02 NEVUS,CONJUNCTIVAL; RIGHT EYE, LEFT EYE: Status: ACTIVE | Noted: 2025-07-15
